# Patient Record
Sex: FEMALE | URBAN - METROPOLITAN AREA
[De-identification: names, ages, dates, MRNs, and addresses within clinical notes are randomized per-mention and may not be internally consistent; named-entity substitution may affect disease eponyms.]

---

## 2022-02-11 ENCOUNTER — MEDICAL CORRESPONDENCE (OUTPATIENT)
Dept: HEALTH INFORMATION MANAGEMENT | Facility: CLINIC | Age: 22
End: 2022-02-11
Payer: COMMERCIAL

## 2022-02-11 ENCOUNTER — TRANSFERRED RECORDS (OUTPATIENT)
Dept: HEALTH INFORMATION MANAGEMENT | Facility: CLINIC | Age: 22
End: 2022-02-11
Payer: COMMERCIAL

## 2022-02-17 ENCOUNTER — TRANSCRIBE ORDERS (OUTPATIENT)
Dept: OTHER | Age: 22
End: 2022-02-17
Payer: COMMERCIAL

## 2022-02-17 DIAGNOSIS — J35.1 TONSILLAR ENLARGEMENT: Primary | ICD-10-CM

## 2022-02-22 NOTE — TELEPHONE ENCOUNTER
FUTURE VISIT INFORMATION      FUTURE VISIT INFORMATION:    Date: 4/25/22    Time: 9:30AM    [place] [facility] [place]ERRAL INFORMATION:    Referring provider:  MGA GUEVARA    Referring providers clinic:  [facility]    Reason for visit/diagnosis  Per Pt, dx tonsillitis, chronic thorughout whole life, Tonsillar enlargement with snoring; has had tonsillitis twice a year since a child; was supposed to have tonsillectomy but not completed referral from MAG GUEVARA    RECORDS REQUESTED FROM:       Clinic name Comments Records Status Imaging Status   [facility] 2/11/22 note from Mag Guevara  req 3/9/22 - received 3/17/22    [facility]  7/24/21 note from Jo Webber NP  Care everywhere    [place] [id number] [facility] 4/13/18 note from Carola Mojica PA-C   Care everywhere                         3/9/22 2:03PM sent a fax to [facility] for recs - Amay   3/17/22 8:05AM received recs from [facility], sent to scan - Amay

## 2022-04-25 ENCOUNTER — PRE VISIT (OUTPATIENT)
Dept: OTOLARYNGOLOGY | Facility: CLINIC | Age: 22
End: 2022-04-25